# Patient Record
(demographics unavailable — no encounter records)

---

## 2024-10-30 NOTE — WORK
[Can return to work without limitations on ______] : can return to work without limitations on [unfilled] [Patient] : patient [No Rx restrictions] : No Rx restrictions. [I provided the services listed above] :  I provided the services listed above. [N/A] : : Not Applicable [FreeTextEntry1] : good

## 2024-10-30 NOTE — PHYSICAL EXAM
[Right] : right foot and ankle [NL (40)] : plantar flexion 40 degrees [NL 30)] : inversion 30 degrees [NL (20)] : eversion 20 degrees [5___] : eversion 5[unfilled]/5 [2+] : posterior tibialis pulse: 2+ [Normal] : saphenous nerve sensation normal [] : non-antalgic [FreeTextEntry3] : Minimal lateral ankle swelling. [de-identified] : WB in lace up brace.  [TWNoteComboBox7] : dorsiflexion 15 degrees

## 2024-10-30 NOTE — DISCUSSION/SUMMARY
[de-identified] : Patient continues to improve. She can return to work full duty. Continue with PT and home exercise program.

## 2024-10-30 NOTE — HISTORY OF PRESENT ILLNESS
[Full time] : Work status: full time [de-identified] : WC 8/31/24: Patient presents for follow up right ankle sprain. 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 She has been going to PT and reports continued improvement. WB with supportive shoes and wears ankle brace less and less. NSAIDs prn.  She is currently working light duty as an EMT. [de-identified] : EMT

## 2024-11-27 NOTE — DISCUSSION/SUMMARY
[de-identified] : Patient has recovered well. She has no restrictions at this time. Ice/compression sleeve/nsaids can be used for her residual swelling on a prn basis.

## 2024-11-27 NOTE — WORK
[Patient] : patient [No Rx restrictions] : No Rx restrictions. [I provided the services listed above] :  I provided the services listed above. [N/A] : : Not Applicable [FreeTextEntry1] : good - patient is working full duty.

## 2024-11-27 NOTE — HISTORY OF PRESENT ILLNESS
[Full time] : Work status: full time [de-identified] :  8/31/24: Patient presents for follow up right ankle sprain. She is no longer going to PT.  She states she is doing much better and feels that she has completely recovered. She is currently working full duty as an EMT.   [de-identified] : EMT

## 2024-11-27 NOTE — PHYSICAL EXAM
[Right] : right foot and ankle [NL (40)] : plantar flexion 40 degrees [NL 30)] : inversion 30 degrees [5___] : eversion 5[unfilled]/5 [2+] : posterior tibialis pulse: 2+ [Normal] : saphenous nerve sensation normal [NL (20)] : dorsiflexion 20 degrees [] : patient ambulates without assistive device [FreeTextEntry3] : Minimal lateral ankle swelling.